# Patient Record
Sex: MALE | Race: WHITE
[De-identification: names, ages, dates, MRNs, and addresses within clinical notes are randomized per-mention and may not be internally consistent; named-entity substitution may affect disease eponyms.]

---

## 2019-08-19 LAB
ANION GAP SERPL CALC-SCNC: 12.7 MMOL/L (ref 8–16)
BASOPHILS # BLD AUTO: 0 10*3/UL (ref 0–0.1)
BASOPHILS NFR BLD AUTO: 0.6 % (ref 0–1)
BUN SERPL-MCNC: 10 MG/DL (ref 7–26)
BUN/CREAT SERPL: 10 (ref 6–25)
CALCIUM SERPL-MCNC: 9.7 MG/DL (ref 8.4–10.2)
CHLORIDE SERPL-SCNC: 104 MMOL/L (ref 98–107)
CO2 SERPL-SCNC: 29 MMOL/L (ref 22–29)
DEPRECATED NEUTROPHILS # BLD AUTO: 4.4 10*3/UL (ref 2.1–6.9)
EGFRCR SERPLBLD CKD-EPI 2021: > 60 ML/MIN (ref 60–?)
EOSINOPHIL # BLD AUTO: 0.2 10*3/UL (ref 0–0.4)
EOSINOPHIL NFR BLD AUTO: 2.9 % (ref 0–6)
ERYTHROCYTE [DISTWIDTH] IN CORD BLOOD: 12.6 % (ref 11.7–14.4)
GLUCOSE SERPLBLD-MCNC: 96 MG/DL (ref 74–118)
HCT VFR BLD AUTO: 42.7 % (ref 38.2–49.6)
HGB BLD-MCNC: 14.4 G/DL (ref 14–18)
LYMPHOCYTES # BLD: 1.6 10*3/UL (ref 1–3.2)
LYMPHOCYTES NFR BLD AUTO: 23.7 % (ref 18–39.1)
MCH RBC QN AUTO: 30.4 PG (ref 28–32)
MCHC RBC AUTO-ENTMCNC: 33.7 G/DL (ref 31–35)
MCV RBC AUTO: 90.1 FL (ref 81–99)
MONOCYTES # BLD AUTO: 0.7 10*3/UL (ref 0.2–0.8)
MONOCYTES NFR BLD AUTO: 9.4 % (ref 4.4–11.3)
NEUTS SEG NFR BLD AUTO: 63.1 % (ref 38.7–80)
PLATELET # BLD AUTO: 237 X10E3/UL (ref 140–360)
POTASSIUM SERPL-SCNC: 4.7 MMOL/L (ref 3.5–5.1)
RBC # BLD AUTO: 4.74 X10E6/UL (ref 4.3–5.7)
SODIUM SERPL-SCNC: 141 MMOL/L (ref 136–145)

## 2019-08-19 NOTE — DIAGNOSTIC IMAGING REPORT
Chest, 2 views,  8/19/2019.   

 



History: Preop, right inguinal hernia.



Comparison: None available.



Findings: The cardiomediastinal silhouette and pulmonary vasculature are within

normal limits. The lungs are clear without evidence of consolidation or pleural

effusion. There are no acute osseous or soft tissue abnormalities. 



Impression: 

No acute cardiopulmonary abnormality.



Signed by: Pradeep De Santiago on 8/19/2019 3:42 PM

## 2019-08-23 ENCOUNTER — HOSPITAL ENCOUNTER (OUTPATIENT)
Dept: HOSPITAL 88 - OR | Age: 64
Discharge: HOME | End: 2019-08-23
Attending: SURGERY
Payer: COMMERCIAL

## 2019-08-23 VITALS — SYSTOLIC BLOOD PRESSURE: 135 MMHG | DIASTOLIC BLOOD PRESSURE: 71 MMHG

## 2019-08-23 DIAGNOSIS — F41.9: ICD-10-CM

## 2019-08-23 DIAGNOSIS — Z91.041: ICD-10-CM

## 2019-08-23 DIAGNOSIS — Z01.811: ICD-10-CM

## 2019-08-23 DIAGNOSIS — Z01.810: ICD-10-CM

## 2019-08-23 DIAGNOSIS — Z01.812: ICD-10-CM

## 2019-08-23 DIAGNOSIS — K40.30: Primary | ICD-10-CM

## 2019-08-23 DIAGNOSIS — F17.210: ICD-10-CM

## 2019-08-23 PROCEDURE — 88302 TISSUE EXAM BY PATHOLOGIST: CPT

## 2019-08-23 PROCEDURE — 85025 COMPLETE CBC W/AUTO DIFF WBC: CPT

## 2019-08-23 PROCEDURE — 36415 COLL VENOUS BLD VENIPUNCTURE: CPT

## 2019-08-23 PROCEDURE — 93005 ELECTROCARDIOGRAM TRACING: CPT

## 2019-08-23 PROCEDURE — 71046 X-RAY EXAM CHEST 2 VIEWS: CPT

## 2019-08-23 PROCEDURE — 80048 BASIC METABOLIC PNL TOTAL CA: CPT

## 2019-08-23 NOTE — XMS REPORT
Patient Summary Document

                             Created on: 2019



AGGIE LAI

External Reference #: [id number]

: 1955

Sex: Male



Demographics







                          Address                   [address] [place] [address]

 

                          Home Phone                [phone]

 

                          Preferred Language        Unknown

 

                          Marital Status            Unknown

 

                          Scientology Affiliation     Unknown

 

                          Race                      Unknown

 

                                        Additional Race(s)  

 

                          Ethnic Group              Unknown





Author







                          Author                    [facility]

 

                          Address                   Unknown

 

                          Phone                     Unavailable







Care Team Providers







                    Care Team Member Name    Role                Phone

 

                    KATHI BURRELL    Unavailable         Unavailable







Problems

This patient has no known problems.



Allergies, Adverse Reactions, Alerts

This patient has no known allergies or adverse reactions.



Medications

This patient has no known medications.



Results







           Test Description    Test Time    Test Comments    Text Results    Atomic Results    Result

 Comments

 

                CHEST 2 VIEWS    2019 15:41:00                                                             

                                             [place] [address]  
   Patient Name: AGGIE LAI                                   MR #: 
[id number]                     : 1955                                  
Age/Sex: 64/M  Acct #: [id number]                              Req #: [id number]
[id number]  [facility] Physician:                                                      
Ordered by: GEE BURRELL MD                            Report #: [id number]
       Location: OR                                      Room/Bed:              
      
___________________________________________________________________________________________________
   Procedure: [id number] DX/CHEST 2 VIEWS  Exam Date: 19                   
        Exam Time: 1431                                              REPORT 
STATUS: Signed    Chest, 2 views,  2019.             History: Preop, right 
inguinal hernia.      Comparison: None available.      Findings: The 
cardiomediastinal silhouette and pulmonary vasculature are within   normal 
limits. The lungs are clear without evidence of consolidation or pleural   
effusion. There are no acute osseous or soft tissue abnormalities.       
Impression:    No acute cardiopulmonary abnormality.      Signed by: Digna De Santiago on 2019 3:42 PM        Dictated By: DIGNA DE SANTIAGO MD  Electronically 
Signed By: DIGNA DE SANTIAGO MD on 19 154  Transcribed By: FINA on 
19       COPY TO:   GEE BURRELL MD

## 2019-08-23 NOTE — OPERATIVE REPORT
DATE OF PROCEDURE:  08/23/2019

 

SURGEON:  Clyde Mckenna MD

 

PREOPERATIVE DIAGNOSIS:  Right inguinal hernia.

 

POSTOPERATIVE DIAGNOSIS:  Right inguinal hernia.

 

OPERATION PERFORMED:  Repair of right inguinal hernia with extended Prolene hernia

system. 

 

ANESTHESIA:  General.

 

COMPLICATIONS:  None.

 

ESTIMATED BLOOD LOSS:  Minimal.

 

DESCRIPTION OF PROCEDURE:  With the patient lying in bed in the supine position under

good general endotracheal anesthesia, the abdomen was prepped with Betadine solution and

draped in the usual manner.  A right inguinal incision was made, it was carried down

through the subcutaneous tissue down to the external oblique aponeurosis.  External

oblique was then opened along the length of its fibers and the external inguinal ring

was opened.  The cord was then mobilized and retracted, contained within the cord, it

was a large hernia sac, which was  from the cord structures.  The hernia sac

was rather thickened and showed signs of intermittent chronic inflammatory process,

likely secondary to intermittent incarceration.  The hernia sac was dissected all the

way down to his neck.  The hernia sac was then opened and there was some omental

adhesions within it, which were taken down, reduced back to the intraabdominal cavity.

After this was done, the sac was then closed with a pursestring suture of 2-0 Ethibond

and 2-0 Ethibond tied, direct the excess was resected.  The preperitoneal space was then

entered and a pocket was created without any difficulty.  An extended Prolene hernia

system was then placed in the preperitoneal space and the underlay patch was deployed

without any problems.  The overlay patch was then placed over the floor and split

inferolaterally to allow for passage of the cord.  The mesh was then sutured to the

conjoined tendon and the inguinal ligament using interrupted sutures of 2-0 Vicryl.  The

whole area was thoroughly irrigated.  Perfect hemostasis was ascertained.  All layers

were infiltrated on the way out with solution of 0.25% Marcaine and 1% lidocaine mixed

in equal parts.  The external oblique aponeurosis was then closed with a running suture

of 2-0 Vicryl, the subcutaneous tissue was approximated with 3-0 plain, and the skin was

closed with clips.  A dressing was applied.  The sponge, lap, and needle counts were

correct.  The patient tolerated the procedure well and returned to the recovery room in

stable condition. 

 

 

 

 

______________________________

MD MAYA Estrada/BENJAMIN

D:  08/23/2019 11:45:43

T:  08/23/2019 12:46:44

Job #:  [id number]

## 2019-10-31 ENCOUNTER — HOSPITAL ENCOUNTER (OUTPATIENT)
Dept: HOSPITAL 88 - OR | Age: 64
Discharge: HOME | End: 2019-10-31
Attending: PLASTIC SURGERY
Payer: COMMERCIAL

## 2019-10-31 VITALS — SYSTOLIC BLOOD PRESSURE: 121 MMHG | DIASTOLIC BLOOD PRESSURE: 73 MMHG

## 2019-10-31 DIAGNOSIS — M60.241: ICD-10-CM

## 2019-10-31 DIAGNOSIS — R22.31: Primary | ICD-10-CM

## 2019-10-31 PROCEDURE — 26116 EXC HAND TUM DEEP < 1.5 CM: CPT

## 2019-10-31 PROCEDURE — 88304 TISSUE EXAM BY PATHOLOGIST: CPT

## 2019-10-31 NOTE — OPERATIVE REPORT
DATE OF PROCEDURE:  10/31/2019

 

SURGEON:  Jesus Sow MD

 

PREOPERATIVE DIAGNOSIS:  Mass, right palm.

 

POSTOPERATIVE DIAGNOSIS:  Mass, right palm, pending final pathology.

 

PROCEDURE:  Excision of mass subfascial, right palm.

 

ANESTHESIA:  General.

 

HISTORY:  The patient is a 64-year-old right-hand-dominant male, who has an enlarging

mass on the right palm for approximately one year.  Risks, benefits, and alternatives of

treatment were discussed with the patient.  He is prepared to undergo the procedure as

outlined. 

 

PROCEDURE IN DETAIL:  The patient was marked preoperatively in the holding area.  He was

brought to the operating theater.  After the induction of adequate general anesthesia,

he was prepped and draped in a supine position and a time-out was performed.  The

procedure was begun by encircling the mass.  The right upper extremity was exsanguinated

and the tourniquet was inflated to a pressure of 250 mmHg.  The incision was made

through the skin and subcutaneous tissue.  Venous tributaries were controlled with the

bipolar cautery.  The mass was noted to extend to the palmar fascia, so for this reason,

the palmar fascia was incised and then the entire area was excised en bloc from skin all

the way down to palmar fascia.  The neurovascular bundle was underneath the fascia

visualized and was well protected and preserved.  The mass was then sent for permanent

pathologic examination.  The wounds were irrigated with bacteriostatic saline and closed

with a 5-0 nylon in an interrupted horizontal mattress fashion.  A Marcaine field block

was performed at the operative site.  Tourniquet was deflated.  All the fingers pinked

up nicely and a sterile bulk conforming bandage was applied.  The patient tolerated the

procedure well and was brought to recovery room in satisfactory condition and discharged

with a postoperative instruction sheet as well as a followup appointment. 

 

 

 

 

______________________________

Jesus Sow MD

 

ER/MODL

D:  10/31/2019 11:51:27

T:  10/31/2019 12:49:34

Job #:  [id number]

## 2019-10-31 NOTE — NUR
SPIRITUAL CARE - Pre-Surgery



Assessment:  Pt in bed. Pt reported supportive attention from family and friends. 

Intervention: I provided pastoral presence, hospitality, and sympathetic listening.  I 
acquainted pt with availability of  while hospitalized.

Outcome: Pt expressed appreciation for visit. No need for follow up indicated at this time. 



LUIS ENRIQUE Carringtonlain

Spiritual Care Department

O: [phone]

Pager: [phone] ([phone])

## 2020-08-10 ENCOUNTER — HOSPITAL ENCOUNTER (OUTPATIENT)
Dept: HOSPITAL 88 - US | Age: 65
End: 2020-08-10
Attending: INTERNAL MEDICINE
Payer: COMMERCIAL

## 2020-08-10 DIAGNOSIS — R31.21: Primary | ICD-10-CM

## 2020-08-10 PROCEDURE — 76770 US EXAM ABDO BACK WALL COMP: CPT

## 2020-08-11 NOTE — DIAGNOSTIC IMAGING REPORT
EXAM: Renal Ultrasound

INDICATION:        

^MICROSCOPIC HEMATURIA  

COMPARISON: None 

TECHNIQUE: Transverse and longitudinal images of the kidneys and bladder were

obtained.  



FINDINGS:     



Right Kidney: 

Length: 11.1 cm

Appearance: Normal echogenicity.  

Collecting system: No hydronephrosis

Stones: None

Cyst/Mass: None



Left Kidney: 

Length: 10.1 cm

Appearance: Normal echogenicity.  

Collecting system: No hydronephrosis

Stones: None

Cyst/Mass: 2.8 x 2.3 x 2.9 cm anechoic mid pole simple cyst.



Bladder: 

No mass or calculi. Bilateral ureteral jets visualized. Prevoid volume estimate

of 426 cc.



The prostate measures 4.5 x 4.5 x 5.9 cm with volume estimate of 62 cc.



IMPRESSION:

No hydronephrosis or renal calculi.



Left mid pole anechoic simple cyst.



Prostatomegaly.



Signed by: Mariya Almaraz MD on 8/11/2020 8:45 AM